# Patient Record
Sex: MALE | URBAN - METROPOLITAN AREA
[De-identification: names, ages, dates, MRNs, and addresses within clinical notes are randomized per-mention and may not be internally consistent; named-entity substitution may affect disease eponyms.]

---

## 2021-03-31 ENCOUNTER — OFFICE VISIT (OUTPATIENT)
Dept: URBAN - METROPOLITAN AREA CLINIC 71 | Facility: CLINIC | Age: 56
End: 2021-03-31
Payer: COMMERCIAL

## 2021-03-31 DIAGNOSIS — H02.811 RETAINED FOREIGN BODY IN RIGHT UPPER EYELID: Primary | ICD-10-CM

## 2021-03-31 PROCEDURE — 92002 INTRM OPH EXAM NEW PATIENT: CPT | Performed by: OPHTHALMOLOGY

## 2021-03-31 ASSESSMENT — INTRAOCULAR PRESSURE
OS: 20
OD: 16

## 2021-03-31 NOTE — IMPRESSION/PLAN
Impression: Retained foreign body in right upper eyelid: H02.811. Small organic matter, appears to be a leaf, under upper lid. Removed with forceps without any complications. Plan: Discussed with patient. No additional treatment necessary. Contact office with any issues.